# Patient Record
Sex: MALE | Race: OTHER | URBAN - METROPOLITAN AREA
[De-identification: names, ages, dates, MRNs, and addresses within clinical notes are randomized per-mention and may not be internally consistent; named-entity substitution may affect disease eponyms.]

---
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
## 2018-01-26 ENCOUNTER — TELEPHONE (OUTPATIENT)
Dept: FAMILY MEDICINE CLINIC | Age: 18
End: 2018-01-26

## 2018-01-26 DIAGNOSIS — Z00.00 ROUTINE GENERAL MEDICAL EXAMINATION AT A HEALTH CARE FACILITY: Primary | ICD-10-CM

## 2018-02-20 ENCOUNTER — OFFICE VISIT (OUTPATIENT)
Dept: FAMILY MEDICINE CLINIC | Age: 18
End: 2018-02-20

## 2018-02-20 VITALS
TEMPERATURE: 98.6 F | OXYGEN SATURATION: 100 % | HEIGHT: 71 IN | HEART RATE: 82 BPM | DIASTOLIC BLOOD PRESSURE: 76 MMHG | SYSTOLIC BLOOD PRESSURE: 120 MMHG | WEIGHT: 169.6 LBS | BODY MASS INDEX: 23.74 KG/M2

## 2018-02-20 DIAGNOSIS — Z23 NEED FOR HPV VACCINATION: ICD-10-CM

## 2018-02-20 DIAGNOSIS — Z00.00 ROUTINE GENERAL MEDICAL EXAMINATION AT A HEALTH CARE FACILITY: Primary | ICD-10-CM

## 2018-02-20 PROCEDURE — 90471 IMMUNIZATION ADMIN: CPT | Performed by: FAMILY MEDICINE

## 2018-02-20 PROCEDURE — 90651 9VHPV VACCINE 2/3 DOSE IM: CPT | Performed by: FAMILY MEDICINE

## 2018-02-20 PROCEDURE — 99395 PREV VISIT EST AGE 18-39: CPT | Performed by: FAMILY MEDICINE

## 2018-02-20 RX ORDER — M-VIT,TX,IRON,MINS/CALC/FOLIC 27MG-0.4MG
1 TABLET ORAL DAILY
COMMUNITY

## 2018-02-20 RX ORDER — CHLORAL HYDRATE 500 MG
1000 CAPSULE ORAL DAILY
COMMUNITY

## 2018-02-20 ASSESSMENT — ENCOUNTER SYMPTOMS
ALLERGIC/IMMUNOLOGIC NEGATIVE: 1
GASTROINTESTINAL NEGATIVE: 1
EYES NEGATIVE: 1
RESPIRATORY NEGATIVE: 1

## 2018-02-21 NOTE — PROGRESS NOTES
2/20/18    Mercedes Sumner   2000      Chief Complaint   Patient presents with    Annual Exam   Well Adult Physical: Patient here for a comprehensive physical exam.The patient reports no problems  Do you take any herbs or supplements that were not prescribed by a doctor? yes Are you taking calcium supplements? no Are you taking aspirin daily? no   History:          /76   Pulse 82   Temp 98.6 °F (37 °C) (Oral)   Ht 5' 11\" (1.803 m)   Wt 169 lb 9.6 oz (76.9 kg)   SpO2 100%   BMI 23.65 kg/m²       Immunization History   Administered Date(s) Administered    DTaP 1998, 02/10/1999, 04/02/1999, 06/05/2000    HPV Gardasil 9-valent 02/20/2018    HPV Gardasil Quadrivalent 07/31/2015    Hepatitis B, unspecified formulation 1998, 1998, 07/29/1999    Hib, unspecified foumulation 1998, 02/10/1999, 04/12/1999, 06/05/2000    IPV (Ipol) 1998, 02/10/1999, 06/05/2000    MMR 06/05/2000    Meningococcal MCV4P (Menactra) 07/10/2010, 07/31/2015    Tdap (Boostrix, Adacel) 07/10/2010, 07/31/2015    Varicella (Varivax) 07/01/1999, 05/31/2010       Allergies   Allergen Reactions    Penicillins      Outpatient Prescriptions Marked as Taking for the 2/20/18 encounter (Office Visit) with Octavio Singh MD   Medication Sig Dispense Refill    Multiple Vitamins-Minerals (THERAPEUTIC MULTIVITAMIN-MINERALS) tablet Take 1 tablet by mouth daily      Omega-3 Fatty Acids (FISH OIL) 1000 MG CAPS Take 1,000 mg by mouth daily         No past medical history on file. No past surgical history on file. Family History   Problem Relation Age of Onset    Arthritis Mother     Depression Mother     Depression Maternal Uncle     Diabetes Maternal Grandmother     Diabetes Maternal Grandfather      Social History     Social History    Marital status: Single     Spouse name: N/A    Number of children: N/A    Years of education: N/A     Occupational History    Not on file.      Social History Main conjunctiva is not injected. Left conjunctiva is not injected. No scleral icterus. Neck: Trachea normal and normal range of motion. Neck supple. No neck rigidity. No tracheal deviation and no edema present. No thyroid mass and no thyromegaly present. Cardiovascular: Normal rate, regular rhythm, normal heart sounds, intact distal pulses and normal pulses. Exam reveals no gallop and no friction rub. No murmur heard. Pulmonary/Chest: Effort normal and breath sounds normal. No stridor. No respiratory distress. He has no wheezes. He has no rales. He exhibits no tenderness. Abdominal: Soft. Bowel sounds are normal. He exhibits no distension, no abdominal bruit and no mass. There is no splenomegaly or hepatomegaly. There is no tenderness. There is no rebound, no guarding and no CVA tenderness. No hernia. Hernia confirmed negative in the right inguinal area and confirmed negative in the left inguinal area. Genitourinary: Testes normal and penis normal. Right testis shows no mass, no swelling and no tenderness. Left testis shows no mass, no swelling and no tenderness. Uncircumcised. No penile tenderness. Musculoskeletal: Normal range of motion. He exhibits no edema or tenderness. Right shoulder: Normal. He exhibits normal range of motion, no tenderness and no deformity. Left shoulder: Normal. He exhibits normal range of motion, no tenderness and no deformity. Right elbow: Normal.He exhibits normal range of motion and no deformity. No tenderness found. Left elbow: Normal. He exhibits normal range of motion and no deformity. No tenderness found. Right wrist: Normal. He exhibits normal range of motion, no tenderness and no deformity. Left wrist: Normal. He exhibits normal range of motion, no tenderness and no deformity. Right hip: Normal. He exhibits normal range of motion, normal strength, no tenderness and no deformity.         Left hip: Normal. He exhibits normal range of motion, normal strength, no bony tenderness and no deformity. Right knee: Normal. He exhibits normal range of motion and no deformity. No tenderness found. Left knee: He exhibits normal range of motion and no deformity. No tenderness found. Right ankle: Normal. He exhibits normal range of motion and no deformity. No tenderness. Achilles tendon normal.        Left ankle: Normal. He exhibits normal range of motion and no deformity. No tenderness. Achilles tendon normal.   Lymphadenopathy:        Head (right side): No submental, no tonsillar and no occipital adenopathy present. Head (left side): No submental, no tonsillar and no occipital adenopathy present. He has no cervical adenopathy. He has no axillary adenopathy. Right: No inguinal adenopathy present. Left: No inguinal adenopathy present. Neurological: He is alert and oriented to person, place, and time. He has normal strength. He displays normal reflexes. No cranial nerve deficit or sensory deficit. He exhibits normal muscle tone. He displays a negative Romberg sign. Coordination and gait normal.   Skin: Skin is warm, dry and intact. No rash noted. He is not diaphoretic. No cyanosis or erythema. No pallor. Nails show no clubbing. Psychiatric: He has a normal mood and affect. His behavior is normal. Judgment and thought content normal.         1. Routine general medical examination at a health care facility  Age appropriate teaching done. Continue all treatments. Immunizations and health maintenance as needed      2. Need for HPV vaccination  HPV vaccine 9-valent IM (GARDASIL 9)       . jagjit Isaacs MD

## 2018-04-30 ENCOUNTER — NURSE ONLY (OUTPATIENT)
Dept: FAMILY MEDICINE CLINIC | Age: 18
End: 2018-04-30

## 2018-04-30 DIAGNOSIS — Z23 NEED FOR HPV VACCINATION: Primary | ICD-10-CM

## 2018-04-30 PROCEDURE — 90651 9VHPV VACCINE 2/3 DOSE IM: CPT | Performed by: FAMILY MEDICINE

## 2018-04-30 PROCEDURE — 90471 IMMUNIZATION ADMIN: CPT | Performed by: FAMILY MEDICINE

## 2018-08-01 ENCOUNTER — OFFICE VISIT (OUTPATIENT)
Dept: FAMILY MEDICINE CLINIC | Age: 18
End: 2018-08-01

## 2018-08-01 VITALS
HEIGHT: 71 IN | WEIGHT: 190 LBS | OXYGEN SATURATION: 98 % | SYSTOLIC BLOOD PRESSURE: 102 MMHG | TEMPERATURE: 98.9 F | HEART RATE: 114 BPM | DIASTOLIC BLOOD PRESSURE: 80 MMHG | BODY MASS INDEX: 26.6 KG/M2

## 2018-08-01 DIAGNOSIS — J30.2 CHRONIC SEASONAL ALLERGIC RHINITIS, UNSPECIFIED TRIGGER: ICD-10-CM

## 2018-08-01 DIAGNOSIS — Z00.00 ANNUAL PHYSICAL EXAM: Primary | ICD-10-CM

## 2018-08-01 PROCEDURE — 99395 PREV VISIT EST AGE 18-39: CPT | Performed by: NURSE PRACTITIONER

## 2018-08-01 RX ORDER — LORATADINE 10 MG/1
10 TABLET ORAL DAILY
Qty: 30 TABLET | Refills: 3 | Status: SHIPPED | OUTPATIENT
Start: 2018-08-01 | End: 2018-08-31

## 2018-08-01 ASSESSMENT — PATIENT HEALTH QUESTIONNAIRE - PHQ9
SUM OF ALL RESPONSES TO PHQ QUESTIONS 1-9: 0
2. FEELING DOWN, DEPRESSED OR HOPELESS: 0
1. LITTLE INTEREST OR PLEASURE IN DOING THINGS: 0
SUM OF ALL RESPONSES TO PHQ9 QUESTIONS 1 & 2: 0

## 2018-08-01 NOTE — PROGRESS NOTES
and updated at appropriate today. Review of Systems   Eyes:        Wears contacts   All other systems reviewed and are negative. Physical Exam   Constitutional: He is oriented to person, place, and time. He appears well-developed and well-nourished. HENT:   Head: Normocephalic. Right Ear: External ear normal.   Left Ear: External ear normal.   Nose: Nose normal.   Mouth/Throat: Oropharynx is clear and moist.   Eyes: Conjunctivae and EOM are normal. Pupils are equal, round, and reactive to light. Neck: Normal range of motion. Neck supple. No thyromegaly present. Cardiovascular: Normal rate and regular rhythm. Exam reveals no friction rub. No murmur heard. Pulmonary/Chest: Effort normal and breath sounds normal. No respiratory distress. Abdominal: Soft. Bowel sounds are normal. He exhibits no distension and no mass. Musculoskeletal: Normal range of motion. He exhibits no edema. Lymphadenopathy:     He has no cervical adenopathy. Neurological: He is alert and oriented to person, place, and time. Skin: Skin is warm and dry. Psychiatric: He has a normal mood and affect. His behavior is normal. Judgment and thought content normal.   Nursing note and vitals reviewed. Assessment:  Encounter Diagnoses   Name Primary?  Annual physical exam Yes    Chronic seasonal allergic rhinitis, unspecified trigger        Controlled Substances Monitoring:  NA    Plan:  1. Annual physical exam  Normal exam    2. Chronic seasonal allergic rhinitis, unspecified trigger  - loratadine (CLARITIN) 10 MG tablet; Take 1 tablet by mouth daily  Dispense: 30 tablet; Refill: 3      Encouraged healthy diet, regular exercise and multivitamin daily. Optho visit q 1-2 years d/t wearing contacts.     RTC in 1 year for well exam

## 2019-05-10 ENCOUNTER — OFFICE VISIT (OUTPATIENT)
Dept: FAMILY MEDICINE CLINIC | Age: 19
End: 2019-05-10
Payer: COMMERCIAL

## 2019-05-10 VITALS
HEIGHT: 72 IN | BODY MASS INDEX: 26.14 KG/M2 | SYSTOLIC BLOOD PRESSURE: 130 MMHG | OXYGEN SATURATION: 99 % | DIASTOLIC BLOOD PRESSURE: 64 MMHG | TEMPERATURE: 98.3 F | WEIGHT: 193 LBS | HEART RATE: 64 BPM

## 2019-05-10 DIAGNOSIS — M67.432 GANGLION CYST OF WRIST, LEFT: ICD-10-CM

## 2019-05-10 DIAGNOSIS — Z71.84 TRAVEL ADVICE ENCOUNTER: ICD-10-CM

## 2019-05-10 DIAGNOSIS — Z76.89 ENCOUNTER TO ESTABLISH CARE: Primary | ICD-10-CM

## 2019-05-10 PROCEDURE — 99214 OFFICE O/P EST MOD 30 MIN: CPT | Performed by: FAMILY MEDICINE

## 2019-05-10 RX ORDER — LORATADINE 10 MG/1
10 CAPSULE, LIQUID FILLED ORAL DAILY
COMMUNITY

## 2019-05-10 SDOH — ECONOMIC STABILITY: FOOD INSECURITY: WITHIN THE PAST 12 MONTHS, THE FOOD YOU BOUGHT JUST DIDN'T LAST AND YOU DIDN'T HAVE MONEY TO GET MORE.: NEVER TRUE

## 2019-05-10 SDOH — HEALTH STABILITY: PHYSICAL HEALTH: ON AVERAGE, HOW MANY MINUTES DO YOU ENGAGE IN EXERCISE AT THIS LEVEL?: 120 MIN

## 2019-05-10 SDOH — ECONOMIC STABILITY: TRANSPORTATION INSECURITY
IN THE PAST 12 MONTHS, HAS THE LACK OF TRANSPORTATION KEPT YOU FROM MEDICAL APPOINTMENTS OR FROM GETTING MEDICATIONS?: NO

## 2019-05-10 SDOH — ECONOMIC STABILITY: FOOD INSECURITY: WITHIN THE PAST 12 MONTHS, YOU WORRIED THAT YOUR FOOD WOULD RUN OUT BEFORE YOU GOT MONEY TO BUY MORE.: NEVER TRUE

## 2019-05-10 SDOH — ECONOMIC STABILITY: TRANSPORTATION INSECURITY
IN THE PAST 12 MONTHS, HAS LACK OF TRANSPORTATION KEPT YOU FROM MEETINGS, WORK, OR FROM GETTING THINGS NEEDED FOR DAILY LIVING?: NO

## 2019-05-10 SDOH — HEALTH STABILITY: MENTAL HEALTH
STRESS IS WHEN SOMEONE FEELS TENSE, NERVOUS, ANXIOUS, OR CAN'T SLEEP AT NIGHT BECAUSE THEIR MIND IS TROUBLED. HOW STRESSED ARE YOU?: NOT AT ALL

## 2019-05-10 SDOH — HEALTH STABILITY: PHYSICAL HEALTH: ON AVERAGE, HOW MANY DAYS PER WEEK DO YOU ENGAGE IN MODERATE TO STRENUOUS EXERCISE (LIKE A BRISK WALK)?: 6 DAYS

## 2019-05-10 SDOH — ECONOMIC STABILITY: INCOME INSECURITY: HOW HARD IS IT FOR YOU TO PAY FOR THE VERY BASICS LIKE FOOD, HOUSING, MEDICAL CARE, AND HEATING?: NOT HARD AT ALL

## 2019-05-10 ASSESSMENT — ENCOUNTER SYMPTOMS
DIARRHEA: 0
COLOR CHANGE: 0
COUGH: 1
CONSTIPATION: 0
RHINORRHEA: 0
SORE THROAT: 0
EYE ITCHING: 1
NAUSEA: 0
VOMITING: 0
ABDOMINAL PAIN: 0

## 2019-05-10 ASSESSMENT — PATIENT HEALTH QUESTIONNAIRE - PHQ9
SUM OF ALL RESPONSES TO PHQ9 QUESTIONS 1 & 2: 0
SUM OF ALL RESPONSES TO PHQ QUESTIONS 1-9: 0
SUM OF ALL RESPONSES TO PHQ QUESTIONS 1-9: 0
2. FEELING DOWN, DEPRESSED OR HOPELESS: 0
1. LITTLE INTEREST OR PLEASURE IN DOING THINGS: 0

## 2019-05-10 NOTE — PROGRESS NOTES
SUBJECTIVE:  Chief Complaint   Patient presents with    Cyst     LEFT WRIST      Omaira Pollock is a 19 y.o.male that presents today for establish care, cyst on his left wrist, pre travel advice/recommendations. Cyst on his left ulnar side of wrist.  Noticed it yesterday 1 day ago. Not painful just noticeable. Protrudes just outward from the skin. Freely mobile. About the size of the marble. Has not tried anything to alleviate it because not painful. Plays basketball a lot but no other issues. No redness, numbness, tingling, or bruising associated with it. He has had some cysts in the past but nothing noticeable like this. Going to [facility] this Summer at the [place]. Past Medical History:   Diagnosis Date    Ganglion cyst of dorsum of left wrist        History reviewed. No pertinent surgical history. Family History   Problem Relation Age of Onset    Arthritis Mother     Depression Mother     Depression Maternal Uncle     Diabetes Maternal Grandmother     Diabetes Maternal Grandfather        Current Outpatient Medications   Medication Sig Dispense Refill    loratadine (CLARITIN) 10 MG capsule Take 10 mg by mouth daily      Multiple Vitamins-Minerals (THERAPEUTIC MULTIVITAMIN-MINERALS) tablet Take 1 tablet by mouth daily      Omega-3 Fatty Acids (FISH OIL) 1000 MG CAPS Take 1,000 mg by mouth daily       No current facility-administered medications for this visit.         Allergies   Allergen Reactions    Penicillins        Social History     Socioeconomic History    Marital status: Single     Spouse name: Not on file    Number of children: 0    Years of education: Not on file    Highest education level: Not on file   Occupational History    Occupation:     Social Needs    Financial resource strain: Not hard at all   [employer]ktourism insecurity:     Worry: Never true     Inability: Never true   [employer] needs:     Medical: No     Non-medical: No   Tobacco Use    Smoking status: Never Smoker    Smokeless tobacco: Never Used   Substance and Sexual Activity    Alcohol use: No     Alcohol/week: 0.0 oz    Drug use: No    Sexual activity: Never   Lifestyle    Physical activity:     Days per week: 6 days     Minutes per session: 120 min    Stress: Not at all   Relationships    Social connections:     Talks on phone: Not on file     Gets together: Not on file     Attends [employer] service: Not on file     Active member of club or organization: Not on file     Attends meetings of clubs or organizations: Not on file     Relationship status: Not on file    Intimate partner violence:     Fear of current or ex partner: Not on file     Emotionally abused: Not on file     Physically abused: Not on file     Forced sexual activity: Not on file   Other Topics Concern    Not on file   Social History Narrative    Not on file       Immunization History   Administered Date(s) Administered    DTaP 1998, 02/10/1999, 04/02/1999, 06/05/2000    HPV Gardasil 9-valent 02/20/2018, 04/30/2018    HPV Gardasil Quadrivalent 07/31/2015    Hepatitis B, unspecified formulation 1998, 1998, 07/29/1999    Hib, unspecified formulation 1998, 02/10/1999, 04/12/1999, 06/05/2000    IPV (Ipol) 1998, 02/10/1999, 06/05/2000    MMRV (ProQuad) 06/05/2000    Meningococcal MCV4P (Menactra) 07/10/2010, 07/31/2015    Tdap (Boostrix, Adacel) 07/10/2010, 07/31/2015    Varicella (Varivax) 07/01/1999, 05/31/2010       Past medical, surgical, and social history reviewed and updated. Medications, immunizations, and allergies reviewed and updated     Review of Systems   Constitutional: Negative for activity change, appetite change, chills, diaphoresis, fatigue and fever. HENT: Positive for sneezing (if doesn't take claritin). Negative for congestion, rhinorrhea and sore throat. Eyes: Positive for itching. Negative for visual disturbance. Respiratory: Positive for cough (scratchy cough). Gastrointestinal: Negative for abdominal pain, constipation, diarrhea, nausea and vomiting. Genitourinary: Negative for dysuria, hematuria, penile pain and testicular pain. Musculoskeletal: Negative for arthralgias and myalgias. Skin: Negative for color change and rash. Allergic/Immunologic: Negative for environmental allergies and food allergies. Neurological: Positive for headaches (occasional migraine, takes excedrin). Psychiatric/Behavioral: Negative for dysphoric mood, sleep disturbance and suicidal ideas. The patient is not nervous/anxious. OBJECTIVE:  /64 (Site: Left Upper Arm, Position: Sitting, Cuff Size: Medium Adult)   Pulse 64   Temp 98.3 °F (36.8 °C) (Oral)   Ht 6' (1.829 m)   Wt 193 lb (87.5 kg)   SpO2 99%   BMI 26.18 kg/m²     Physical Exam   Constitutional: He is oriented to person, place, and time. He appears well-developed and well-nourished. No distress. HENT:   Head: Normocephalic and atraumatic. Right Ear: Tympanic membrane normal.   Left Ear: Tympanic membrane normal.   Nose: Nose normal. No rhinorrhea. Right sinus exhibits no maxillary sinus tenderness and no frontal sinus tenderness. Left sinus exhibits no maxillary sinus tenderness and no frontal sinus tenderness. Mouth/Throat: Uvula is midline, oropharynx is clear and moist and mucous membranes are normal.   Eyes: Pupils are equal, round, and reactive to light. EOM are normal.   Neck: No tracheal deviation present. Cardiovascular: Normal rate, regular rhythm and normal heart sounds. Exam reveals no gallop and no friction rub. No murmur heard. Pulmonary/Chest: Effort normal and breath sounds normal. No respiratory distress. He has no wheezes. He has no rales. Abdominal: Soft. Bowel sounds are normal. He exhibits no distension. There is no hepatosplenomegaly. There is no tenderness. There is no rebound and no CVA tenderness. Musculoskeletal: Normal range of motion.  He exhibits no edema or tenderness. Left wrist: He exhibits deformity (ganglion cyst present on the ulnar aspect of the dorsum of the left wrist). Lymphadenopathy:     He has no cervical adenopathy. Neurological: He is alert and oriented to person, place, and time. He has normal strength. No cranial nerve deficit. Reflex Scores:       Tricep reflexes are 2+ on the right side and 2+ on the left side. Bicep reflexes are 2+ on the right side and 2+ on the left side. Brachioradialis reflexes are 2+ on the right side and 2+ on the left side. Patellar reflexes are 2+ on the right side and 2+ on the left side. Skin: Skin is warm and dry. No rash noted. No erythema. Psychiatric: He has a normal mood and affect. His speech is normal. He expresses no homicidal and no suicidal ideation. ASSESSMENT/PLAN:    Andrew Johnson is a 20 y/o previously healthy male who presents today to establish care, has concerns for a new onset left dorsal ganglion cyst of the wrist, and for travel advice encounter as he is traveling to [facility] and to [place] in the Summer. Referral to orthopedics for ganglion cyst.  Counseled on travel safety, recommend Hepatitis A series and MMR vaccine booster, malaria prophylaxis medication. Given CDC information for each destination, travel safety/health. 1. Encounter to establish care  -chart/records reviewed, history and physical performed    2. Ganglion cyst of wrist, left  - Fauzia Staley MD, Hand Surgery (Hand, Wrist, Elbow), [facility]    3. Travel advice encounter  -counseled as above; would recommend Hep A and MMR vaccines, patient is practicing Ramadan and cannot receive vaccines until after 6/7/2019. Reviewed treatment plan with patient. Patient verbalized understanding to treatment plan and questions were answered. Return in about 1 year (around 5/10/2020). Kushal Centeno.  Brandon Michele.      5/10/2019

## 2019-05-10 NOTE — PATIENT INSTRUCTIONS
Patient Education   -see orthopedics for your cyst on your hand  -we can prescribe medication for anti-malarial travel need    Visit these websites for more information:  [employer]   https://wwwnc.cdc.gov/travel/destinations/clinician/none/[place]  Return after June 7th for Hepatitis A vaccine. I will look into the Typhoid vaccine  Ganglions: Care Instructions  Your Care Instructions    A ganglion is a small sac, or cyst, filled with a clear fluid that is like jelly. A ganglion may look like a bump on the hand or wrist. It also can appear on your feet, ankles, knees, or shoulders. It is not cancer. A ganglion can grow out of the protective area, or capsule, around a joint. It also can grow on a tendon sheath, which covers the ropelike tendons that connect muscle to bone. A ganglion may hurt or cause numbness if it presses on a nerve. Many ganglions do not need treatment, and they often go away on their own. But if a ganglion hurts, becomes larger, causes numbness, or limits your activity, your doctor may want to drain it with a needle and syringe or remove it with minor surgery. Follow-up care is a key part of your treatment and safety. Be sure to make and go to all appointments, and call your doctor if you are having problems. It's also a good idea to know your test results and keep a list of the medicines you take. How can you care for yourself at home? · Wear a wrist or finger splint as directed by your doctor. It will keep your wrist or hand from moving and help reduce the fluid in the cyst. This may be all you need for the ganglion to shrink and go away. · Do not smash a ganglion with a book or other heavy object. You may break a bone or otherwise injure your wrist by trying this folk remedy, and the ganglion may return anyway. · Do not try to drain the fluid by poking the ganglion with a pin or any other sharp object.  You could cause

## 2019-06-13 ENCOUNTER — TELEPHONE (OUTPATIENT)
Dept: ORTHOPEDIC SURGERY | Age: 19
End: 2019-06-13

## 2020-02-05 ENCOUNTER — OFFICE VISIT (OUTPATIENT)
Dept: FAMILY MEDICINE CLINIC | Age: 20
End: 2020-02-05
Payer: COMMERCIAL

## 2020-02-05 VITALS
HEIGHT: 72 IN | WEIGHT: 182 LBS | DIASTOLIC BLOOD PRESSURE: 70 MMHG | BODY MASS INDEX: 24.65 KG/M2 | HEART RATE: 62 BPM | OXYGEN SATURATION: 98 % | SYSTOLIC BLOOD PRESSURE: 122 MMHG | TEMPERATURE: 98.5 F

## 2020-02-05 PROCEDURE — 99213 OFFICE O/P EST LOW 20 MIN: CPT | Performed by: FAMILY MEDICINE

## 2020-02-05 ASSESSMENT — ENCOUNTER SYMPTOMS
COUGH: 1
RHINORRHEA: 0
ABDOMINAL PAIN: 0
CONSTIPATION: 0
EYE PAIN: 0
BLOOD IN STOOL: 0
BACK PAIN: 0
DIARRHEA: 0
EYE DISCHARGE: 0
SHORTNESS OF BREATH: 0
WHEEZING: 0
COLOR CHANGE: 0
SORE THROAT: 1

## 2020-02-05 NOTE — PROGRESS NOTES
SUBJECTIVE:  Chief Complaint   Patient presents with    Cough     1 week    Headache     Mercedes Cristobal is a 19 y.o.male that presents today for cough for 1 week, fatigue, headache. Occasionally productive sputum, clear mucous, nothing chunky or green. Tried OTC cough syrup not helped. Nothing makes worse. Still able to play basketball/exercise everyday. Worse in morning, when tries to talk has mucous in throat. Not in chest or sinuses, just feels like gunk in his throat. Sometimes cough or dry cough. Does not feel like the symptoms are severe. No prodromal symptoms present. No changes in body habits. Sick contact, younger brother is sick with fever. Past Medical History:   Diagnosis Date    Ganglion cyst of dorsum of left wrist      History reviewed. No pertinent surgical history. Family History   Problem Relation Age of Onset    Arthritis Mother     Depression Mother     Depression Maternal Uncle     Diabetes Maternal Grandmother     Diabetes Maternal Grandfather      Current Outpatient Medications   Medication Sig Dispense Refill    loratadine (CLARITIN) 10 MG capsule Take 10 mg by mouth daily      Multiple Vitamins-Minerals (THERAPEUTIC MULTIVITAMIN-MINERALS) tablet Take 1 tablet by mouth daily      Omega-3 Fatty Acids (FISH OIL) 1000 MG CAPS Take 1,000 mg by mouth daily       No current facility-administered medications for this visit.       Allergies   Allergen Reactions    Penicillins      Social History     Socioeconomic History    Marital status: Single     Spouse name: Not on file    Number of children: 0    Years of education: Not on file    Highest education level: Not on file   Occupational History    Occupation:     Social Needs    Financial resource strain: Not hard at all   [employer] insecurity:     Worry: Never true     Inability: Never true   [employer] needs:     Medical: No     Non-medical: No   Tobacco Use    Smoking status: Never Smoker    Smokeless

## 2020-03-09 ENCOUNTER — OFFICE VISIT (OUTPATIENT)
Dept: FAMILY MEDICINE CLINIC | Age: 20
End: 2020-03-09
Payer: COMMERCIAL

## 2020-03-09 VITALS
WEIGHT: 176 LBS | HEIGHT: 72 IN | BODY MASS INDEX: 23.84 KG/M2 | HEART RATE: 111 BPM | TEMPERATURE: 98.5 F | SYSTOLIC BLOOD PRESSURE: 104 MMHG | DIASTOLIC BLOOD PRESSURE: 68 MMHG | OXYGEN SATURATION: 99 %

## 2020-03-09 PROCEDURE — 99213 OFFICE O/P EST LOW 20 MIN: CPT | Performed by: FAMILY MEDICINE

## 2020-03-09 RX ORDER — MELOXICAM 15 MG/1
15 TABLET ORAL DAILY
Qty: 30 TABLET | Refills: 0 | Status: SHIPPED | OUTPATIENT
Start: 2020-03-09

## 2020-03-09 ASSESSMENT — ENCOUNTER SYMPTOMS: SHORTNESS OF BREATH: 0

## 2020-03-09 NOTE — PATIENT INSTRUCTIONS
on your shoulder. Some doctors suggest alternating between hot and cold. · Rest your shoulder for a few days. If your doctor recommends it, you can then begin gentle exercise of the shoulder, but do not lift anything heavy. When should you call for help? Call 911 anytime you think you may need emergency care. For example, call if:    · You have chest pain or pressure. This may occur with:  ? Sweating. ? Shortness of breath. ? Nausea or vomiting. ? Pain that spreads from the chest to the neck, jaw, or one or both shoulders or arms. ? Dizziness or lightheadedness. ? A fast or uneven pulse. After calling  911, chew 1 adult-strength aspirin. Wait for an ambulance. Do not try to drive yourself.     · Your arm or hand is cool or pale or changes color.    Call your doctor now or seek immediate medical care if:    · You have signs of infection, such as:  ? Increased pain, swelling, warmth, or redness in your shoulder. ? Red streaks leading from a place on your shoulder. ? Pus draining from an area of your shoulder. ? Swollen lymph nodes in your neck, armpits, or groin. ? A fever.    Watch closely for changes in your health, and be sure to contact your doctor if:    · You cannot use your shoulder.     · Your shoulder does not get better as expected. Where can you learn more? Go to https://[employer] Internationalpeneydaeb.DashBurst. org and sign in to your Eyeonplay account. Enter X475 in the [facility] box to learn more about \"Shoulder Pain: Care Instructions. \"     If you do not have an account, please click on the \"Sign Up Now\" link. Current as of: June 26, 2019  Content Version: 12.3  © 0006-4982 [employer]. Care instructions adapted under license by [facility] ([facility]). If you have questions about a medical condition or this instruction, always ask your healthcare professional. [address] any warranty or liability for your use of this information.          Patient Education Triceps Tendinitis: Exercises  Introduction  Here are some examples of exercises for you to try. The exercises may be suggested for a condition or for rehabilitation. Start each exercise slowly. Ease off the exercises if you start to have pain. You will be told when to start these exercises and which ones will work best for you. How to do the exercises  Elbow flexion and extension   1. Stand with your arms relaxed at your sides. 2. With your affected arm, gently bend your elbow up toward you as far as possible. Your palm should face up. 3. Then straighten your arm as much as you can. 4. Repeat 2 to 4 times. Triceps stretch   1. Stand with your back straight and your feet shoulder-width apart. You can do this stretch sitting down if you are not steady on your feet. 2. Bring your left elbow straight up while bending your arm. 3. Grab your left elbow with your right hand, and pull your left elbow toward your head with light pressure. If you are more flexible, you may pull your arm slightly behind your head. You will feel the stretch along the back of your arm. 4. Hold 15 to 30 seconds, then switch elbows. 5. Repeat 2 to 4 times for each arm. Elbow extension: Resisted chair stand   1. Sit in a firm chair with sturdy armrests. Keep your feet flat on the floor. 2. Place your hands on the armrests and push yourself up from the chair, keeping your hands on the armrests. 3. Use your legs as needed to push yourself up. 4. Return slowly to your seated position. 5. Repeat 8 to 12 times. Triceps extension   1. Tie an exercise band so that there is a loop or a knot in the middle. Attach the loop or knot to a secure object, or shut a door on the knot to hold it in place. The band should be attached at a place that is above your head. 2. Stand so you face the place where you have secured the band. Hold one end of the band in each hand, with your hands at shoulder level.   3. Keep your elbows tucked in at your sides. 4. Slowly pull the bands down so your arms are straight at your sides. Make sure you keep your elbows close to your body as you pull down. 5. Return slowly to your starting position. 6. Repeat 8 to 12 times. Pulldown   1. Tie an exercise band so that there is a loop or a knot in the middle. Attach the loop to a secure object or shut a door on the knot to hold it in place. The band should be attached at a place that is above your head. 2. Stand so you face the place where you have secured the band. 3. Hold one end of the band in each hand. Your hands should be at about forehead level, with your arms stretched out in front of you. Hold your hands a little farther apart than the width of your shoulders. 4. Slowly pull the bands down to chest level so that your elbows are bent and your hands are at chest level. 5. Slowly return to your starting position. 6. Repeat 8 to 12 times. Follow-up care is a key part of your treatment and safety. Be sure to make and go to all appointments, and call your doctor if you are having problems. It's also a good idea to know your test results and keep a list of the medicines you take. Where can you learn more? Go to https://[employer].Imaxio. org and sign in to your Amperion account. Enter B767 in the [employer] box to learn more about \"Triceps Tendinitis: Exercises. \"     If you do not have an account, please click on the \"Sign Up Now\" link. Current as of: June 26, 2019  Content Version: 12.3  © 4278-3057 Healthwise[employer]. Care instructions adapted under license by [facility] ([facility]). If you have questions about a medical condition or this instruction, always ask your healthcare professional. [address] any warranty or liability for your use of this information.

## 2020-03-09 NOTE — PROGRESS NOTES
rest, ice, heat, activity limitation and slow return to normal exercise activity. Follow-up in the next 2 weeks and if no improvement referral to sports medicine orthopedics. 1. Acute pain of right shoulder  2. Triceps strain, right, initial encounter  - meloxicam (MOBIC) 15 MG tablet; Take 1 tablet by mouth daily  Dispense: 30 tablet; Refill: 0  -take meloxicam (mobic) 15 mg daily with food once a day  -use a heating pad or ice 20 on 20 off at night while watching tv or reading or whatever doing before bed  -slowly return to exercise meaning weight bearing on the right shoulder tricep area  -i'd take the rest of the week off and maybe even next week  -if no improvement by weekend or early next week, we will have you see orthopedics/sports medicine    Reviewed treatment plan with patient. Patient verbalized understanding to treatment plan and questions were answered. Return if symptoms worsen or fail to improve. Argelia Franco.  Jhony Speak.      3/9/2020

## 2021-01-14 ENCOUNTER — TELEPHONE (OUTPATIENT)
Dept: PRIMARY CARE CLINIC | Age: 21
End: 2021-01-14
Payer: COMMERCIAL

## 2021-01-14 DIAGNOSIS — J02.9 SORE THROAT: Primary | ICD-10-CM

## 2021-01-14 PROCEDURE — 87880 STREP A ASSAY W/OPTIC: CPT | Performed by: FAMILY MEDICINE

## 2021-01-15 ENCOUNTER — NURSE TRIAGE (OUTPATIENT)
Dept: OTHER | Facility: CLINIC | Age: 21
End: 2021-01-15

## 2021-01-15 ENCOUNTER — OFFICE VISIT (OUTPATIENT)
Dept: PRIMARY CARE CLINIC | Age: 21
End: 2021-01-15
Payer: COMMERCIAL

## 2021-01-15 DIAGNOSIS — Z88.0 HISTORY OF PENICILLIN ALLERGY: ICD-10-CM

## 2021-01-15 DIAGNOSIS — Z11.59 SCREENING FOR VIRAL DISEASE: Primary | ICD-10-CM

## 2021-01-15 DIAGNOSIS — J02.0 PHARYNGITIS DUE TO GROUP A BETA HEMOLYTIC STREPTOCOCCI: Primary | ICD-10-CM

## 2021-01-15 LAB — S PYO AG THROAT QL: POSITIVE

## 2021-01-15 PROCEDURE — 99211 OFF/OP EST MAY X REQ PHY/QHP: CPT | Performed by: NURSE PRACTITIONER

## 2021-01-15 RX ORDER — AZITHROMYCIN 250 MG/1
250 TABLET, FILM COATED ORAL SEE ADMIN INSTRUCTIONS
Qty: 6 TABLET | Refills: 0 | Status: SHIPPED | OUTPATIENT
Start: 2021-01-15 | End: 2021-01-20

## 2021-01-15 NOTE — PROGRESS NOTES
Luke Lomax received a viral test for COVID-19. They were educated on isolation and quarantine as appropriate. For any symptoms, they were directed to seek care from their PCP, given contact information to establish with a doctor, directed to an urgent care or the emergency room.

## 2021-01-15 NOTE — TELEPHONE ENCOUNTER
Taking ibuprofen and tylenol. Able to take in fluids but hurts to swallow so solids are difficult. He did speak with the office yesterday and a strep test was ordered but not scheduled. Scheduled for a Covid test for today, would like to also be soon. Attempted to assist with scheduling however Arcadio Ford at the [facility] states that she can not schedule for Dr Otto Patricia. Advised Mercedes to call office directly to schedule. Reason for Disposition   SEVERE sore throat pain    Answer Assessment - Initial Assessment Questions  1. ONSET: \"When did the throat start hurting? \" (Hours or days ago)       2 days ago    2. SEVERITY: \"How bad is the sore throat? \" (Scale 1-10; mild, moderate or severe)    - MILD (1-3):  doesn't interfere with eating or normal activities    - MODERATE (4-7): interferes with eating some solids and normal activities    - SEVERE (8-10):  excruciating pain, interferes with most normal activities    - SEVERE DYSPHAGIA: can't swallow liquids, drooling      Moderate    3. STREP EXPOSURE: \"Has there been any exposure to strep within the past week? \" If so, ask: \"What type of contact occurred? \"       No    4. VIRAL SYMPTOMS: Birda Memory there any symptoms of a cold, such as a runny nose, cough, hoarse voice or red eyes? \"       Chills    5. FEVER: \"Do you have a fever? \" If so, ask: \"What is your temperature, how was it measured, and when did it start? \"      No    6. PUS ON THE TONSILS: \"Is there pus on the tonsils in the back of your throat? \"      No but very red    7. OTHER SYMPTOMS: \"Do you have any other symptoms? \" (e.g., difficulty breathing, headache, rash)      Slight headache 4/10    8. PREGNANCY: \"Is there any chance you are pregnant? \" \"When was your last menstrual period? \"      NA    Protocols used: SORE THROAT-ADULT-[place]

## 2021-01-15 NOTE — TELEPHONE ENCOUNTER
Reason for Disposition   [1] COVID-19 infection suspected by caller or triager AND [2] mild symptoms (cough, fever, or others) AND [6] no complications or SOB    Answer Assessment - Initial Assessment Questions  1. COVID-19 DIAGNOSIS: \"Who made your Coronavirus (COVID-19) diagnosis? \" \"Was it confirmed by a positive lab test?\" If not diagnosed by a HCP, ask \"Are there lots of cases (community spread) where you live? \" (See public health department website, if unsure)      No diagnosis yet    2. COVID-19 EXPOSURE: \"Was there any known exposure to COVID before the symptoms began? \" CDC Definition of close contact: within 6 feet (2 meters) for a total of 15 minutes or more over a 24-hour period. Unknown, goes to gym and plays basketball with othere. 3. ONSET: \"When did the COVID-19 symptoms start? \"       3 days ago    4. WORST SYMPTOM: \"What is your worst symptom? \" (e.g., cough, fever, shortness of breath, muscle aches)      Sore throat    5. COUGH: \"Do you have a cough? \" If so, ask: \"How bad is the cough? \"        No    6. FEVER: \"Do you have a fever? \" If so, ask: \"What is your temperature, how was it measured, and when did it start? \"      Above 100,  Scanner    7. RESPIRATORY STATUS: \"Describe your breathing? \" (e.g., shortness of breath, wheezing, unable to speak)       Doesn't look like he's breathing well, but pt says he's breathing okay. 8. BETTER-SAME-WORSE: \"Are you getting better, staying the same or getting worse compared to yesterday? \"  If getting worse, ask, \"In what way? \"      About the same as yesterday    9. HIGH RISK DISEASE: \"Do you have any chronic medical problems? \" (e.g., asthma, heart or lung disease, weak immune system, obesity, etc.)      No    10. PREGNANCY: \"Is there any chance you are pregnant? \" \"When was your last menstrual period? \"        No    11. OTHER SYMPTOMS: \"Do you have any other symptoms? \"  (e.g., chills, fatigue, headache, loss of smell or taste, muscle pain, sore throat; new loss of smell or taste especially support the diagnosis of COVID-19)        Fatigue,body aches    Protocols used: CORONAVIRUS (COVID-19) DIAGNOSED OR SUSPECTED-ADULT-    [facility] [place] [id number] [facility] Brief description of triage:sore throat, fever and fatigue x 3 days    Triage indicates for patient to Call PCP when office open. (Had called PCP office prior to calling Hot Line. Office gave them Hot line number to get testing. Referred to [facility] at [phone]    Care advice provided. Recommended using local department of health website for testing locations and up to date information. Caller verbalizes understanding, denies any other questions or concerns; instructed to call back for any new or worsening symptoms.

## 2021-01-15 NOTE — PATIENT INSTRUCTIONS
Advance Care Planning  People with COVID-19 may have no symptoms, mild symptoms, such as fever, cough, and shortness of breath or they may have more severe illness, developing severe and fatal pneumonia. As a result, Advance Care Planning with attention to naming a health care decision maker (someone you trust to make healthcare decisions for you if you could not speak for yourself) and sharing other health care preferences is important BEFORE a possible health crisis. Please contact your Primary Care Provider to discuss Advance Care Planning. Preventing the Spread of Coronavirus Disease 2019 in Homes and Residential Communities  For the most recent information go to [email or web address]    Prevention steps for People with confirmed or suspected COVID-19 (including persons under investigation) who do not need to be hospitalized  and   People with confirmed COVID-19 who were hospitalized and determined to be medically stable to go home    Your healthcare provider and public health staff will evaluate whether you can be cared for at home. If it is determined that you do not need to be hospitalized and can be isolated at home, you will be monitored by staff from your local or state health department. You should follow the prevention steps below until a healthcare provider or local or state health department says you can return to your normal activities. Stay home except to get medical care  People who are mildly ill with COVID-19 are able to isolate at home during their illness. You should restrict activities outside your home, except for getting medical care. Do not go to work, school, or public areas. Avoid using public transportation, ride-sharing, or taxis. Separate yourself from other people and animals in your home  People: As much as possible, you should stay in a specific room and away from other people in your home.  Also, you should use a separate before eating or preparing food. If soap and water are not readily available, use an alcohol-based hand  with at least 60% alcohol, covering all surfaces of your hands and rubbing them together until they feel dry. Soap and water are the best option if hands are visibly dirty. Avoid touching your eyes, nose, and mouth with unwashed hands. Avoid sharing personal household items  You should not share dishes, drinking glasses, cups, eating utensils, towels, or bedding with other people or pets in your home. After using these items, they should be washed thoroughly with soap and water. Clean all high-touch surfaces everyday  High touch surfaces include counters, tabletops, doorknobs, bathroom fixtures, toilets, phones, keyboards, tablets, and bedside tables. Also, clean any surfaces that may have blood, stool, or body fluids on them. Use a household cleaning spray or wipe, according to the label instructions. Labels contain instructions for safe and effective use of the cleaning product including precautions you should take when applying the product, such as wearing gloves and making sure you have good ventilation during use of the product. Monitor your symptoms  Seek prompt medical attention if your illness is worsening (e.g., difficulty breathing). Before seeking care, call your healthcare provider and tell them that you have, or are being evaluated for, COVID-19. Put on a facemask before you enter the facility. These steps will help the healthcare providers office to keep other people in the office or waiting room from getting infected or exposed. Ask your healthcare provider to call the local or state health department. Persons who are placed under active monitoring or facilitated self-monitoring should follow instructions provided by their local health department or occupational health professionals, as appropriate. When working with your local health department check their available hours.   If you have a medical emergency and need to call 911, notify the dispatch personnel that you have, or are being evaluated for COVID-19. If possible, put on a facemask before emergency medical services arrive. Discontinuing home isolation  Patients with confirmed COVID-19 should remain under home isolation precautions until the risk of secondary transmission to others is thought to be low. The decision to discontinue home isolation precautions should be made on a case-by-case basis, in consultation with healthcare providers and state and local health departments.

## 2021-01-16 LAB — SARS-COV-2: NOT DETECTED
